# Patient Record
Sex: FEMALE | Race: WHITE | NOT HISPANIC OR LATINO | Employment: FULL TIME | URBAN - METROPOLITAN AREA
[De-identification: names, ages, dates, MRNs, and addresses within clinical notes are randomized per-mention and may not be internally consistent; named-entity substitution may affect disease eponyms.]

---

## 2023-12-11 ENCOUNTER — ANCILLARY PROCEDURE (OUTPATIENT)
Dept: RADIOLOGY | Facility: CLINIC | Age: 63
End: 2023-12-11
Payer: COMMERCIAL

## 2023-12-11 ENCOUNTER — OFFICE VISIT (OUTPATIENT)
Dept: ORTHOPEDIC SURGERY | Facility: CLINIC | Age: 63
End: 2023-12-11
Payer: COMMERCIAL

## 2023-12-11 VITALS — HEIGHT: 66 IN | BODY MASS INDEX: 22.5 KG/M2 | WEIGHT: 140 LBS

## 2023-12-11 DIAGNOSIS — M25.512 ACUTE PAIN OF LEFT SHOULDER: ICD-10-CM

## 2023-12-11 DIAGNOSIS — S46.019A TRAUMATIC ROTATOR CUFF TEAR, INITIAL ENCOUNTER: ICD-10-CM

## 2023-12-11 DIAGNOSIS — M25.512 ACUTE PAIN OF LEFT SHOULDER: Primary | ICD-10-CM

## 2023-12-11 PROCEDURE — 73030 X-RAY EXAM OF SHOULDER: CPT | Mod: LT,FY

## 2023-12-11 PROCEDURE — 99204 OFFICE O/P NEW MOD 45 MIN: CPT | Performed by: STUDENT IN AN ORGANIZED HEALTH CARE EDUCATION/TRAINING PROGRAM

## 2023-12-11 PROCEDURE — 1036F TOBACCO NON-USER: CPT | Performed by: STUDENT IN AN ORGANIZED HEALTH CARE EDUCATION/TRAINING PROGRAM

## 2023-12-11 PROCEDURE — 99214 OFFICE O/P EST MOD 30 MIN: CPT | Performed by: STUDENT IN AN ORGANIZED HEALTH CARE EDUCATION/TRAINING PROGRAM

## 2023-12-11 PROCEDURE — 73030 X-RAY EXAM OF SHOULDER: CPT | Mod: LEFT SIDE | Performed by: STUDENT IN AN ORGANIZED HEALTH CARE EDUCATION/TRAINING PROGRAM

## 2023-12-11 RX ORDER — NAPROXEN 500 MG/1
500 TABLET ORAL 2 TIMES DAILY
Qty: 60 TABLET | Refills: 1 | Status: SHIPPED | OUTPATIENT
Start: 2023-12-11 | End: 2024-02-09

## 2023-12-11 ASSESSMENT — PATIENT HEALTH QUESTIONNAIRE - PHQ9
SUM OF ALL RESPONSES TO PHQ9 QUESTIONS 1 AND 2: 0
1. LITTLE INTEREST OR PLEASURE IN DOING THINGS: NOT AT ALL
2. FEELING DOWN, DEPRESSED OR HOPELESS: NOT AT ALL

## 2023-12-11 ASSESSMENT — PAIN SCALES - GENERAL: PAINLEVEL_OUTOF10: 5 - MODERATE PAIN

## 2023-12-11 ASSESSMENT — PAIN - FUNCTIONAL ASSESSMENT: PAIN_FUNCTIONAL_ASSESSMENT: 0-10

## 2023-12-11 NOTE — PROGRESS NOTES
Chief Complaint   Patient presents with    Left Shoulder - New Patient Visit     1. Left shoulder pain x a couple months   DOI- Pain after being at the gym   X-Rays today        HPI  63-year-old female presents today for evaluation of the left shoulder.  She has been having pain and discomfort in her shoulder for the past several months after exercise at a local gym.  Patient states the pain is sharp.  Begins about the shoulder region and radiates down the arm.  He has a difficult time at night sleeping.    History reviewed. No pertinent past medical history.    Past Surgical History:   Procedure Laterality Date    OTHER SURGICAL HISTORY  06/30/2022    Knee surgery    OTHER SURGICAL HISTORY  06/30/2022    Complete colonoscopy    OTHER SURGICAL HISTORY  06/30/2022    Varicose vein ligation        Not on File     Physical exam    General: Alert and oriented to place, person, and time.  No acute distress and breathing comfortably; pleasant and cooperative with the examination.  HEENT: Head is normocephalic and atraumatic.  Neck: Supple, no visible swelling.  Cardiovascular: Good perfusion to the affected extremity.  Lungs: No audible wheezing or labored breathing.  Abdomen: Nondistended  HEME/Lymph : No visible abnormalities bilateral lower extremity    Extremity:  Left shoulder:  Skin healthy to gross inspection  No ecchymosis, no edema, no gross atrophy  No tenderness to palpation over acromioclavicular joint  Moderate tenderness to palpation over biceps tendon  No tenderness to palpation over the cervical spine    ROM: All range of motion of the shoulder      Strength:  4/5 Resisted elevation  5/5 Resisted external rotation     Negative lift off test   Negative Spurling´s test  +Neer and Hawking´s test  + Speed's test  Neurovascular exam normal distally      Diagnostics:  No results found.     Procedure:  Procedures    Assessment:  63-year-old female with left shoulder arthritis possible rotator cuff tear  traumatic etiology    Treatment plan:  The natural history of the condition and its associated treatment alternatives including surgical and nonsurgical options were discussed with the patient at length.  The history and clinical exam are consistent with intraarticular pathology and therefore MRI is medically indicated to evaluate the soft tissues of the joint. Follow up in one week or after completion of the MRI to advance management accordingly  The patient understands and agrees with the plan.  Will provide prescription for naproxen  We discussed the use of nonsteroidal anti-inflammatory drugs as part of a treatment plan. We discussed that these may result in GI upset and/or bleeding. Any stomach pain or dark hard stools would be reason to discontinue use. We discussed that when used over the long-term these medications can result in altered renal or hepatic function, and that routine use beyond 3 months requires follow-up with their primary provider for monitoring.  They expressed their understanding and agree with the plan.    Patient is Sai Birmingham's mom    All of the patient's questions were answered.

## 2023-12-27 ENCOUNTER — HOSPITAL ENCOUNTER (OUTPATIENT)
Dept: MRI IMAGING | Age: 63
Discharge: HOME | End: 2023-12-29
Attending: STUDENT IN AN ORGANIZED HEALTH CARE EDUCATION/TRAINING PROGRAM
Payer: COMMERCIAL

## 2023-12-27 ENCOUNTER — APPOINTMENT (OUTPATIENT)
Dept: RADIOLOGY | Facility: HOSPITAL | Age: 63
End: 2023-12-27
Payer: COMMERCIAL

## 2023-12-27 DIAGNOSIS — S46.019A TRAUMATIC ROTATOR CUFF TEAR, UNSPECIFIED LATERALITY, UNSPECIFIED TEAR EXTENT, INITIAL ENCOUNTER: ICD-10-CM

## 2023-12-27 PROCEDURE — 73221 MRI JOINT UPR EXTREM W/O DYE: CPT

## 2024-01-05 ENCOUNTER — OFFICE VISIT (OUTPATIENT)
Dept: ORTHOPEDIC SURGERY | Facility: CLINIC | Age: 64
End: 2024-01-05
Payer: COMMERCIAL

## 2024-01-05 DIAGNOSIS — M19.012 PRIMARY OSTEOARTHRITIS OF LEFT SHOULDER: ICD-10-CM

## 2024-01-05 PROCEDURE — 99214 OFFICE O/P EST MOD 30 MIN: CPT | Performed by: STUDENT IN AN ORGANIZED HEALTH CARE EDUCATION/TRAINING PROGRAM

## 2024-01-05 PROCEDURE — 20610 DRAIN/INJ JOINT/BURSA W/O US: CPT | Mod: LT | Performed by: STUDENT IN AN ORGANIZED HEALTH CARE EDUCATION/TRAINING PROGRAM

## 2024-01-05 PROCEDURE — 1036F TOBACCO NON-USER: CPT | Performed by: STUDENT IN AN ORGANIZED HEALTH CARE EDUCATION/TRAINING PROGRAM

## 2024-01-05 PROCEDURE — 2500000004 HC RX 250 GENERAL PHARMACY W/ HCPCS (ALT 636 FOR OP/ED): Performed by: STUDENT IN AN ORGANIZED HEALTH CARE EDUCATION/TRAINING PROGRAM

## 2024-01-05 PROCEDURE — 2500000005 HC RX 250 GENERAL PHARMACY W/O HCPCS: Performed by: STUDENT IN AN ORGANIZED HEALTH CARE EDUCATION/TRAINING PROGRAM

## 2024-01-05 ASSESSMENT — PAIN SCALES - GENERAL: PAINLEVEL_OUTOF10: 3

## 2024-01-05 ASSESSMENT — PAIN - FUNCTIONAL ASSESSMENT: PAIN_FUNCTIONAL_ASSESSMENT: 0-10

## 2024-01-05 ASSESSMENT — PAIN DESCRIPTION - DESCRIPTORS: DESCRIPTORS: ACHING

## 2024-01-07 PROCEDURE — 20610 DRAIN/INJ JOINT/BURSA W/O US: CPT | Performed by: STUDENT IN AN ORGANIZED HEALTH CARE EDUCATION/TRAINING PROGRAM

## 2024-01-07 RX ORDER — LIDOCAINE HYDROCHLORIDE 10 MG/ML
4 INJECTION INFILTRATION; PERINEURAL
Status: COMPLETED | OUTPATIENT
Start: 2024-01-07 | End: 2024-01-07

## 2024-01-07 RX ORDER — TRIAMCINOLONE ACETONIDE 40 MG/ML
40 INJECTION, SUSPENSION INTRA-ARTICULAR; INTRAMUSCULAR
Status: COMPLETED | OUTPATIENT
Start: 2024-01-07 | End: 2024-01-07

## 2024-01-07 RX ADMIN — LIDOCAINE HYDROCHLORIDE 4 ML: 10 INJECTION, SOLUTION INFILTRATION; PERINEURAL at 07:31

## 2024-01-07 RX ADMIN — TRIAMCINOLONE ACETONIDE 40 MG: 40 INJECTION, SUSPENSION INTRA-ARTICULAR; INTRAMUSCULAR at 07:31

## 2024-01-07 NOTE — PROGRESS NOTES
Chief Complaint   Patient presents with    Left Shoulder - Follow-up     Lt shoulder OA possible RCT traumatic etiology  MRI 12/27/23         HPI  Patient presents today for follow up of left shoulder MRI results.  Continues to have some pain particular to her left shoulder.  Feels like it is weak as well.  Last visit we did obtain a MRI of the shoulder for evaluation of the rotator cuff given findings of weakness.  Presents today for discussion of MRI results..       Physical exam  General: Alert and oriented to place, person, and time.  No acute distress and breathing comfortably; pleasant and cooperative with the examination.  Extremity:  Left shoulder:  Skin healthy to gross inspection  No ecchymosis, no edema, no gross atrophy  No tenderness to palpation over acromioclavicular joint  Moderate tenderness to palpation over biceps tendon  No tenderness to palpation over the cervical spine      ROM: All range of motion of the shoulder noted with mild crepitus     Strength:  4/5 Resisted elevation  5/5 Resisted external rotation     Negative lift off test   Negative Spurling´s test  +Neer and Hawking´s test  + Speed's test  Neurovascular exam normal distally    Diagnostics:  MR shoulder left wo IV contrast    Result Date: 12/30/2023  EXAMINATION: MRI OF THE LEFT SHOULDER WITHOUT CONTRAST   12/27/2023 3:33 pm TECHNIQUE: Multiplanar multisequence MRI of the left shoulder was performed without the administration of intravenous contrast. COMPARISON: None. HISTORY: ORDERING SYSTEM PROVIDED HISTORY: Traumatic rotator cuff tear, unspecified laterality, unspecified tear extent, initial encounter TECHNOLOGIST PROVIDED HISTORY: What reading provider will be dictating this exam?->CRC FINDINGS: ROTATOR CUFF: Intact supraspinatus, infraspinatus, subscapularis and teres minor tendons. No significant muscle edema or atrophy. BICEPS TENDON: Intact vertical and horizontal portions of the long head of the biceps tendon. LABRUM:  No SLAP lesion.  No paralabral cyst.  Diffuse labral degeneration is noted with degenerative tearing. GLENOHUMERAL JOINT: Severe glenohumeral degenerative joint disease.  Mild posterior subluxation of the humeral head.  Small glenohumeral joint effusion.. AC JOINT AND ACROMIOCLAVICULAR ARCH: No significant acromial downsloping or subacromial spur.  Mild degenerative changes. Intact acromioclavicular and coracoclavicular ligaments.  No significant subacromial/subdeltoid bursitis. Type 1 acromial morphology. BONE MARROW: No evidence of fracture. Normal marrow signal. OUTLET SPACES: Normal MRI appearance of the quadrilateral space.   No significant narrowing of the supraspinatus outlet.    1.  Severe glenohumeral degenerative joint disease.  Posterior subluxation of the humeral head.  Small glenohumeral joint effusion. 2.  Diffuse labral degeneration with degenerative tearing. 3.  Mild rotator cuff tendinopathy.    XR shoulder left 2+ views    Result Date: 12/11/2023  Interpreted By:  Dallin Boucher III, STUDY: XR SHOULDER LEFT 2+ VIEWS; ;  12/11/2023 1:29 pm   INDICATION: Signs/Symptoms:pain.   COMPARISON: None.   ACCESSION NUMBER(S): [id number]   ORDERING CLINICIAN: DALLIN BOUCHER   FINDINGS: Four views left shoulder: Moderate to severe joint space narrowing about the left glenohumeral articulation. Subtle high-riding humeral head consistent with rotator cuff arthropathy. No acute osseous abnormality.       Moderate-to-severe shoulder arthritis     MACRO: None   Signed by: Dallin Boucher III 12/11/2023 2:24 PM Dictation workstation:   [id number]       Procedures  L Inj/Asp: L glenohumeral on 1/7/2024 7:31 AM  Indications: pain  Details: 22 G needle, posterior approach  Medications: 40 mg triamcinolone acetonide 40 mg/mL; 4 mL lidocaine 10 mg/mL (1 %)  Consent was given by the patient. Immediately prior to procedure a time out was called to verify the correct patient, procedure, equipment, support staff and site/side  marked as required. Patient was prepped and draped in the usual sterile fashion.            Assessment:  63-year-old female with left severe shoulder arthritis primary etiology    Treatment plan:  Will proceed with intra-articular injection today  I discussed risks and benefits of corticosteroid injection and the patient would like to proceed.  Discussed risks of skin depigmentation and the rare but possible intravascular injection of local anesthetic which can cause palpitations or arrhythmias. I discussed the possibility of a transient increase in blood sugar. I explained that the local anesthetic tends to work immediately, it may take 2-3 days for the full effect of the corticosteroid compound. Consent was obtained and side confirmed by the patient.  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

## 2024-01-10 ENCOUNTER — HOSPITAL ENCOUNTER (OUTPATIENT)
Dept: PHYSICAL THERAPY | Age: 64
Setting detail: THERAPIES SERIES
Discharge: HOME | End: 2024-01-10
Payer: COMMERCIAL

## 2024-01-10 PROCEDURE — 97162 PT EVAL MOD COMPLEX 30 MIN: CPT

## 2024-01-10 ASSESSMENT — PAIN SCALES - GENERAL: PAINLEVEL_OUTOF10: 0

## 2024-01-10 ASSESSMENT — PAIN DESCRIPTION - LOCATION: LOCATION: ARM

## 2024-01-10 ASSESSMENT — PAIN DESCRIPTION - ORIENTATION: ORIENTATION: LEFT

## 2024-01-10 ASSESSMENT — PAIN DESCRIPTION - DESCRIPTORS: DESCRIPTORS: SHARP

## 2024-01-10 ASSESSMENT — PAIN DESCRIPTION - PAIN TYPE: TYPE: CHRONIC PAIN

## 2024-01-10 NOTE — PROGRESS NOTES
[facility]      Physical Therapy: Initial Evaluation    Patient: Radha Lopez (63 y.o.     female)   Examination Date: 01/10/2024   :  1960 ;    Confirmed: Yes MRN: [id number]  CSN: [id number]   Insurance: Payor: [facility] / Plan: BCBS - [place] PPO / Product Type: *No Product type* /   Insurance ID: [id number] - ([facility])  PT Insurance Information: [facility] Secondary Insurance (if applicable):     Referring Physician: Dimitry Mckinney MD       Visits to Date/Visits Approved:     No Show/Cancelled Appts: 0      Medical Diagnosis: Primary osteoarthritis, left shoulder [M19.012]        Treatment Diagnosis: L shoulder pain with decreased ROM and strength, impaired tolerance to reaching, lifting, and UE WBing     PERTINENT MEDICAL HISTORY   Patient Assessed for Rehabilitation Services: Yes       Medical History: Chart Reviewed: Yes No past medical history on file.  Surgical History: No past surgical history on file.    Medications: No current outpatient medications on file.  Allergies: Patient has no allergy information on record.      Imaging (if applicable):     MRI SHOULDER LEFT WO CONTRAST    Result Date: 2023  EXAMINATION: MRI OF THE LEFT SHOULDER WITHOUT CONTRAST   2023 3:33 pm TECHNIQUE: Multiplanar multisequence MRI of the left shoulder was performed without the administration of intravenous contrast. COMPARISON: None. HISTORY: ORDERING SYSTEM PROVIDED HISTORY: Traumatic rotator cuff tear, unspecified laterality, unspecified tear extent, initial encounter TECHNOLOGIST PROVIDED HISTORY: What reading provider will be dictating this exam?->CRC FINDINGS:   ROTATOR CUFF: Intact supraspinatus, infraspinatus, subscapularis and teres minor tendons. No significant muscle edema or atrophy.   BICEPS TENDON: Intact vertical and horizontal portions of the long head of the biceps tendon. LABRUM: No SLAP lesion.  No paralabral cyst.

## 2024-01-10 NOTE — PLAN OF CARE
Electronically signed by Zohaib Waterman PT on 1/10/24 at 2:22 PM EST      If you have any questions or concerns, please don't hesitate to call.  Thank you for your referral.    I have reviewed this plan of care and certify a need for medically necessary rehabilitation services.    Physician Signature:__________________________________________________________  Date:  Please sign and return

## 2024-01-17 ENCOUNTER — HOSPITAL ENCOUNTER (OUTPATIENT)
Dept: PHYSICAL THERAPY | Age: 64
Setting detail: THERAPIES SERIES
Discharge: HOME | End: 2024-01-17
Payer: COMMERCIAL

## 2024-01-17 PROCEDURE — 97110 THERAPEUTIC EXERCISES: CPT

## 2024-01-17 PROCEDURE — 97140 MANUAL THERAPY 1/> REGIONS: CPT

## 2024-01-17 NOTE — PROGRESS NOTES
[facility]  Outpatient Physical Therapy    Treatment Note        Date: 2024  Patient: Radha Lopez  : 1960   Confirmed: Yes  MRN: [id number]  Referring Provider: Dimitry Mckinney MD    Medical Diagnosis: Primary osteoarthritis, left shoulder [M19.012]       Treatment Diagnosis: L shoulder pain with decreased ROM and strength, impaired tolerance to reaching, lifting, and UE WBing    Visit Information:  Insurance: Payor: [facility] / Plan: BC[place] - [place] PPO / Product Type: *No Product type* /   PT Visit Information  Onset Date:  (2023)  PT Insurance Information: BCBS  Total # of Visits Approved: 4  Total # of Visits to Date: 2  Plan of Care/Certification Expiration Date: 24  No Show: 0  Canceled Appointment: 0  Progress Note Counter: 1/4V by 24    Subjective Information:  Subjective: Pt reports she believes her cortisone injection is starting to work. She notes occasional twinges of pain in her left anterior shoulder and down her L arm. Pt says she participated in her exercise class today but her L arm is still weak with overhead presses (15# using her RUE and only 5# with her LUE)      Pain Screening  Patient Currently in Pain: No    Treatment:  Exercises:  Exercises  Exercise 1: shoulder pulleys x 3 min  Exercise 2: supine chest press: 5# and 10# (training on exercise mechanics)  Exercise 3: incline plank at EOB (cuing for scapular protraction prior to weight shift)  Exercise 4: unilateral lat pulls: 3 plates (cuing for scapular initiaion)  Exercise 8: HEP: shoulder horz add stretch, shoulder ER with ABD stretch       Manual:   Manual Therapy  Joint Mobilization: GHJ inf/post/ant glides, scapulothoraic MWM during flex, horz abd, aduc  Other: L shoulder PROM ER coupled with abduction; manual x 15  Treatment Reasoning  Limitations addressed: Joint motion, Tissue extensibility    *Indicates exercise, modality, or manual techniques to be initiated when

## 2024-01-26 ENCOUNTER — HOSPITAL ENCOUNTER (OUTPATIENT)
Dept: PHYSICAL THERAPY | Age: 64
Setting detail: THERAPIES SERIES
Discharge: HOME | End: 2024-01-26
Payer: COMMERCIAL

## 2024-01-26 PROCEDURE — 97110 THERAPEUTIC EXERCISES: CPT

## 2024-01-26 NOTE — PROGRESS NOTES
[facility]  Outpatient Physical Therapy    Treatment Note        Date: 2024  Patient: Radha Lopez  : 1960   Confirmed: Yes  MRN: [id number]  Referring Provider: Dimitry Mckinney MD    Medical Diagnosis: Primary osteoarthritis, left shoulder [M19.012]       Treatment Diagnosis: L shoulder pain with decreased ROM and strength, impaired tolerance to reaching, lifting, and UE WBing    Visit Information:  Insurance: Payor: [facility] / Plan: BC[place] - [place] PPO / Product Type: *No Product type* /   PT Visit Information  Onset Date:  (2023)  PT Insurance Information: BCBS  Total # of Visits Approved: 4  Total # of Visits to Date: 3  Plan of Care/Certification Expiration Date: 24  No Show: 1  Canceled Appointment: 0  Progress Note Counter: 2/4V by 24    Subjective Information:  Subjective: Pt reports no pain today. States she felt pretty good after last visit.  HEP Compliance:  [x] Good [] Fair [] Poor [] Reports not doing due to:    Pain Screening  Patient Currently in Pain: Denies    Treatment:  Exercises:  Exercises  Exercise 1: shoulder pulleys x 3 min  Exercise 2: supine unilateral chest press: 2  x 10 - 10# (training on exercise mechanics)  Exercise 3: incline plank at EOB + Scapular Pushups x 10  Exercise 4: unilateral rows/ lat pulls: 2 plates (cuing for scapular initiaion) x 10  Exercise 5: IR Stretch 10 x 5-10\"  Exercise 6: Cat/Cow x 10  Exercise 7: Quad Thoracic Rotation with reach x 5  Exercise 8: HEP: cont current     Objective Measures:            Assessment:   Body Structures, Functions, Activity Limitations Requiring Skilled Therapeutic Intervention: Increased pain, Decreased ROM, Decreased strength, Decreased ADL status, Decreased high-level IADLs, Decreased body mechanics  Assessment: Additional strengthening and mobility exercises to assist patient in normalizing ROM and strength in L shoulder. No increased discomfort in L shoulder throughout session.

## 2024-01-30 ENCOUNTER — HOSPITAL ENCOUNTER (OUTPATIENT)
Dept: PHYSICAL THERAPY | Age: 64
Setting detail: THERAPIES SERIES
Discharge: HOME | End: 2024-01-30
Payer: COMMERCIAL

## 2024-01-30 PROCEDURE — 97110 THERAPEUTIC EXERCISES: CPT

## 2024-01-30 NOTE — PROGRESS NOTES
[facility]  Outpatient Physical Therapy    Treatment Note        Date: 2024  Patient: Radha Lopez  : 1960   Confirmed: Yes  MRN: [id number]  Referring Provider: Dimitry Mckinney MD    Medical Diagnosis: Primary osteoarthritis, left shoulder [M19.012]       Treatment Diagnosis: L shoulder pain with decreased ROM and strength, impaired tolerance to reaching, lifting, and UE WBing    Visit Information:  Insurance: Payor: [facility] / Plan: BCBS - [place]PO / Product Type: *No Product type* /   PT Visit Information  Onset Date:  (2023)  PT Insurance Information: BCBS  Total # of Visits Approved: 4  Total # of Visits to Date: 4  Plan of Care/Certification Expiration Date: 24  No Show: 1  Canceled Appointment: 0  Progress Note Counter: 3/4V by 24    Subjective Information:  Subjective: Pt reports her shoulder has been feeling pretty good. Pt notes improving ability to perform upper body exercise at her fitness classes. Pt expresses ongoing difficulty to reach up her back.  HEP Compliance:  [x] Good [] Fair [] Poor [] Reports not doing due to:    Pain Screening  Patient Currently in Pain: Denies    Treatment:  Exercises:  Exercises  Exercise 1: sleep stretch reviewed for L shoulder IR ROM  Exercise 2: 1/2 mountain climbers upper trunk rotation (fitness class exercise) reviewed  Exercise 3: thoracic extensions supine with foam roll  Exercise 4: pec stretch supine on foam roll  Exercise 5: shoulder flexion stretch supine on foam roll       Manual:    Scapulothoracic mobs with passive L shoulder flexion and horizontal abduction x 3 min       *Indicates exercise, modality, or manual techniques to be initiated when appropriate    Objective Measures:   See below    Assessment:   Body Structures, Functions, Activity Limitations Requiring Skilled Therapeutic Intervention: Increased pain, Decreased ROM, Decreased strength, Decreased ADL status, Decreased high-level IADLs, Decreased

## 2024-02-16 ENCOUNTER — OFFICE VISIT (OUTPATIENT)
Dept: ORTHOPEDIC SURGERY | Facility: CLINIC | Age: 64
End: 2024-02-16
Payer: COMMERCIAL

## 2024-02-16 DIAGNOSIS — M19.012 PRIMARY OSTEOARTHRITIS OF LEFT SHOULDER: Primary | ICD-10-CM

## 2024-02-16 PROCEDURE — 1036F TOBACCO NON-USER: CPT | Performed by: STUDENT IN AN ORGANIZED HEALTH CARE EDUCATION/TRAINING PROGRAM

## 2024-02-16 PROCEDURE — 99213 OFFICE O/P EST LOW 20 MIN: CPT | Performed by: STUDENT IN AN ORGANIZED HEALTH CARE EDUCATION/TRAINING PROGRAM

## 2024-02-16 NOTE — PROGRESS NOTES
Chief Complaint   Patient presents with    Left Shoulder - Follow-up     S/p injection  Ht 5'6  Wt 140 lbs  Pain level 0       HPI  Patient presents today for follow up of left shoulder.  Patient with known history of left shoulder arthritis status post intra-articular injection of the shoulder this provided her significant relief 0 out of 10 pain today.  Very happy with her progress.  Has been doing home exercises.      Physical exam  General: Alert and oriented to place, person, and time.  No acute distress and breathing comfortably; pleasant and cooperative with the examination.  Extremity:  Left shoulder:  Skin healthy to gross inspection  No ecchymosis, no edema, no gross atrophy  No tenderness to palpation over acromioclavicular joint  No tenderness to palpation over biceps tendon  No tenderness to palpation over the cervical spine      ROM:  Full forward flexion  Full external rotation  IR symmetric to contralateral side  Strength:  5/5 Resisted elevation  5/5 Resisted external rotation     Negative lift off test   Negative Spurling´s test  Negative Neer and Hawking´s test  Negative Speed's test  Neurovascular exam normal distally    Diagnostics:  No results found.     Procedures  Procedures     Assessment:  63-year-old female with left moderate shoulder arthritis    Treatment plan:  Will continue with conservative treatment  Range of motion as tolerated activities as tolerated using pain as a guide  Patient will return to clinic when injection does wear off  We will likely proceed with another intra-articular injection at that point in time  Discussed activities to avoid  All of the patient's questions concerns answered